# Patient Record
(demographics unavailable — no encounter records)

---

## 2024-11-05 NOTE — PHYSICAL EXAM
[Normal] : affect appropriate [de-identified] : Well, healthy appearing adult  [de-identified] : EOMI, no conjunctival injection, anicteric [de-identified] : No visualized masses, JVD or audible bruits noted [de-identified] : Equal chest rise, non-labored respirations, no audible wheezing  [de-identified] : Regular rate, no audible carotid bruits or JVD appreciated  [de-identified] : soft, NT, ND, no diastasis or hernias appreciated, incisions well healed  [de-identified] : MARLA

## 2024-11-05 NOTE — ASSESSMENT
[FreeTextEntry1] : 37 year old F is s/p Laparoscopic Sleeve Gastrectomy. Feels great, in a good place. Weight stable since last visit. Reports reflux/stuck episodes is the same though after last visit started omeprazole 40 mg in AM and Pepcid 20 mg in PM. Also still having constipation though Linzess is improving symptom, managed by GI Dr. Leong. Doing well overall  Reviewed guidelines about nutrition and snacking Protein focus diet with 3 meals/day Increase dietary fiber Constipation remedies list given to pt - recommend smooth move tea Reviewed importance of bariatric MVI - daily vitamin requirements list given to pt Increase daily exercise regimen incorporating cardio and strength training  Track steps - goal approximately 8-10k steps per day  Last labs performed 2/4/2023 ([facility]) Fasting labs ordered today - requisition given to pt   Continue omeprazole 40mg QAM and Pepcid 20mg QHS - prescriptions renewed Will need work up for reflux if not improved, EGD  Continue Linzess as per GI Follow up with hematologist for possible iron infusion therapy to improve constipation Pt seen today by nutritionist Kerry Escobedo for 15min Return to office in 4-6 weeks All questions answered   Additional time spent before and after visit reviewing chart

## 2024-11-05 NOTE — REVIEW OF SYSTEMS
[Negative] : Allergic/Immunologic [Constipation] : constipation [Reflux/Heartburn] : reflux/heartburn [FreeTextEntry7] : see HPI

## 2024-11-05 NOTE — HISTORY OF PRESENT ILLNESS
[Procedure: ___] : Procedure performed: [unfilled]  [Date of Surgery: ___] : Date of Surgery:   [unfilled] [Surgeon Name:   ___] : Surgeon Name: Dr. ISAACS [Pre-Op Weight ___] : Pre-op weight was [unfilled] lbs [de-identified] : 37 year old F is s/p Laparoscopic Sleeve Gastrectomy. Feels great, in a good place. Weight stable since last visit. Reports daytime reflux/stuck episodes is the same though after last visit started omeprazole 40 mg in AM and Pepcid 20 mg in PM. Also still having constipation though Linzess is improving symptom, managed by GI Dr. Leong. Consuming an 3 meals/day, taking 30min to consume and will wait some time to finish plate. Drinking adequate zero calorie liquid ~48 oz/day. Taking over the counter MVI. Walking daily >45min/day. Sleeping ~ 7 hours/night.No abdominal pain, reflux, nausea, vomiting, constipation or diarrhea.   Pt currently stopped trying to conceive to get her health in a better state.

## 2025-01-09 NOTE — PHYSICAL EXAM
[Normal] : affect appropriate [de-identified] : Well, healthy appearing adult  [de-identified] : EOMI, no conjunctival injection, anicteric [de-identified] : No visualized masses, JVD or audible bruits noted [de-identified] : Equal chest rise, non-labored respirations, no audible wheezing  [de-identified] : soft, NT, ND, no diastasis or hernias appreciated, incisions well healed  [de-identified] : MARLA

## 2025-01-09 NOTE — HISTORY OF PRESENT ILLNESS
[Procedure: ___] : Procedure performed: [unfilled]  [Date of Surgery: ___] : Date of Surgery:   [unfilled] [Surgeon Name:   ___] : Surgeon Name: Dr. ISAACS [Pre-Op Weight ___] : Pre-op weight was [unfilled] lbs [de-identified] : 37 year old F is s/p Laparoscopic Sleeve Gastrectomy presents for follow-up. Weight stable since last visit 2 months ago. Constipation has improved after taking smooth move tea, senna and a short course of Linzess. Pt reports halitosis but no reflux that is managed by GI with omeprazole but not relieving symptom - pt has high stress risers. Otherwise, no abdominal pain, nausea/vomiting, or diarrhea. Consuming 3 protein-rich meals/day. Drinking adequate zero-calorie fluids ~80oz/day. Pt has not yet restarted bariatric MVI due to concern of constipation from the iron content. Walking for activities.

## 2025-01-09 NOTE — ASSESSMENT
[FreeTextEntry1] : 37 year old F is s/p Laparoscopic Sleeve Gastrectomy presents for follow-up. Weight stable since last visit 2 months ago. Overall doing well  Reviewed guidelines about nutrition and snacking Protein focus diet with 3 meals/day Increase dietary fiber Continue smooth move tea Reviewed importance of bariatric MVI caps without iron - daily vitamin requirements list given to pt Increase daily exercise regimen incorporating cardio and strength training  Track steps - goal approximately 8-10k steps per day  Last labs performed 2/4/2023 ([facility]) Fasting labs ordered - pt getting done after visit  Return to office in annually or sooner if needed Continue omeprazole 40mg QAM as per GI - if halitosis persists will have pt follow up with GI for repeat EGD Follow up with hematologist for possible iron infusion therapy  Follow-up with Nutrition Follow up with [facility] psychologist - referral to therapist Halitosis possibly from high stress risers - stress reduction modalities  All questions answered Pt verbalized understanding and in agreement of the above   Additional time spent before and after visit reviewing chart

## 2025-01-09 NOTE — HISTORY OF PRESENT ILLNESS
[Procedure: ___] : Procedure performed: [unfilled]  [Date of Surgery: ___] : Date of Surgery:   [unfilled] [Surgeon Name:   ___] : Surgeon Name: Dr. ISAACS [Pre-Op Weight ___] : Pre-op weight was [unfilled] lbs [de-identified] : 37 year old F is s/p Laparoscopic Sleeve Gastrectomy presents for follow-up. Weight stable since last visit 2 months ago. Constipation has improved after taking smooth move tea, senna and a short course of Linzess. Pt reports halitosis but no reflux that is managed by GI with omeprazole but not relieving symptom - pt has high stress risers. Otherwise, no abdominal pain, nausea/vomiting, or diarrhea. Consuming 3 protein-rich meals/day. Drinking adequate zero-calorie fluids ~80oz/day. Pt has not yet restarted bariatric MVI due to concern of constipation from the iron content. Walking for activities.

## 2025-01-09 NOTE — REVIEW OF SYSTEMS
[Patient Intake Form Reviewed] : Patient intake form was reviewed [Negative] : Allergic/Immunologic [FreeTextEntry4] : halitosis - see HPI